# Patient Record
Sex: FEMALE | Race: WHITE | NOT HISPANIC OR LATINO | Employment: OTHER | URBAN - METROPOLITAN AREA
[De-identification: names, ages, dates, MRNs, and addresses within clinical notes are randomized per-mention and may not be internally consistent; named-entity substitution may affect disease eponyms.]

---

## 2018-10-18 ENCOUNTER — TELEPHONE (OUTPATIENT)
Dept: GYNECOLOGIC ONCOLOGY | Facility: CLINIC | Age: 38
End: 2018-10-18

## 2018-10-18 NOTE — TELEPHONE ENCOUNTER
----- Message from Stevie Marlow MA sent at 10/17/2018 10:43 AM CDT -----  1st avail either doc  ----- Message -----  From: Sania Robin  Sent: 10/17/2018   9:58 AM  To: [facility]    Name of Who is Calling: KARLA WILSON [[id number]]    What is the request in detail: Pt would like to make an appt for a second opinion.       Can the clinic reply by [facility]:   No       What Number to Call Back if not in [facility]:[phone]

## 2018-10-30 ENCOUNTER — TELEPHONE (OUTPATIENT)
Dept: GYNECOLOGIC ONCOLOGY | Facility: CLINIC | Age: 38
End: 2018-10-30